# Patient Record
Sex: MALE | Race: WHITE
[De-identification: names, ages, dates, MRNs, and addresses within clinical notes are randomized per-mention and may not be internally consistent; named-entity substitution may affect disease eponyms.]

---

## 2019-02-26 ENCOUNTER — HOSPITAL ENCOUNTER (EMERGENCY)
Dept: HOSPITAL 26 - MED | Age: 39
Discharge: HOME | End: 2019-02-26
Payer: SELF-PAY

## 2019-02-26 VITALS — HEIGHT: 66 IN | WEIGHT: 145 LBS | BODY MASS INDEX: 23.3 KG/M2

## 2019-02-26 VITALS — DIASTOLIC BLOOD PRESSURE: 73 MMHG | SYSTOLIC BLOOD PRESSURE: 118 MMHG

## 2019-02-26 VITALS — SYSTOLIC BLOOD PRESSURE: 122 MMHG | DIASTOLIC BLOOD PRESSURE: 78 MMHG

## 2019-02-26 DIAGNOSIS — Z88.1: ICD-10-CM

## 2019-02-26 DIAGNOSIS — L03.113: Primary | ICD-10-CM

## 2019-02-26 PROCEDURE — 99283 EMERGENCY DEPT VISIT LOW MDM: CPT

## 2019-02-26 PROCEDURE — 96372 THER/PROPH/DIAG INJ SC/IM: CPT

## 2019-02-26 PROCEDURE — 73090 X-RAY EXAM OF FOREARM: CPT

## 2019-02-26 NOTE — NUR
Patient discharged with v/s stable. Written and verbal after care instructions 
given and explained. 

Patient alert, oriented and verbalized understanding of instructions. 
Ambulatory with steady gait. All questions addressed prior to discharge. ID 
band removed. Patient advised to follow up with PMD. Rx of Motrin, and Bactrim 
given. Patient educated on indication of medication including possible reaction 
and side effects. Opportunity to ask questions provided and answered. Pt left 
with wheather appropriate clothing, homeless bag meal and bus pass provided.

## 2019-02-26 NOTE — NUR
PT BIB SELF C/O RIGHT HAND PAIN. PT STATES THAT HE WOKE UP TO HIS HAND BEING 
SWOLLEN AND RED THIS MORNING. DENIES TRAUMA OR INJURY. 7/10 BURNING PAIN AT 
THIS TIME.

--MODERATE SWELLING AND REDNESS NOTED, NO DISCHARGE OR VISUAL DEFORMITIES, 
ACTIVE ROM, RADIAL PULSES +3 EQUAL BL. CAP REFIL <3. 



--DENIES N/V/D; SKIN IS PINK/WARM/DRY; AAOX4 WITH EVEN AND STEADY GAIT; LUNGS 
CLEAR BL; HR EVEN AND REGULAR; PT DENIES ANY FEVER, CP, SOB, OR COUGH AT THIS 
TIME; VSS; PATIENT POSITIONED FOR COMFORT; HOB ELEVATED; BEDRAILS UP X1; BED 
DOWN. ER MD MADE AWARE OF PT STATUS.



--PT STATES HE WAS LIVING IN A TRAILER W/FRIENDS, GOT KICKED OUT TODAY. PT 
STATES HE WOULD LIKE TO GO TO A SHELTER. 



PMH: DENIES

RX: DENIES